# Patient Record
Sex: FEMALE | Race: OTHER | Employment: UNEMPLOYED | URBAN - METROPOLITAN AREA
[De-identification: names, ages, dates, MRNs, and addresses within clinical notes are randomized per-mention and may not be internally consistent; named-entity substitution may affect disease eponyms.]

---

## 2018-10-22 ENCOUNTER — OFFICE VISIT (OUTPATIENT)
Dept: FAMILY MEDICINE CLINIC | Age: 38
End: 2018-10-22

## 2018-10-22 VITALS
WEIGHT: 175 LBS | BODY MASS INDEX: 33.04 KG/M2 | HEIGHT: 61 IN | DIASTOLIC BLOOD PRESSURE: 85 MMHG | SYSTOLIC BLOOD PRESSURE: 134 MMHG | HEART RATE: 60 BPM | TEMPERATURE: 98.4 F

## 2018-10-22 DIAGNOSIS — N94.10 DYSPAREUNIA IN FEMALE: ICD-10-CM

## 2018-10-22 DIAGNOSIS — Z13.9 ENCOUNTER FOR SCREENING: Primary | ICD-10-CM

## 2018-10-22 DIAGNOSIS — N30.01 ACUTE CYSTITIS WITH HEMATURIA: ICD-10-CM

## 2018-10-22 LAB
BILIRUB UR QL STRIP: NORMAL
GLUCOSE UR-MCNC: NEGATIVE MG/DL
HCG URINE, QL. (POC): NEGATIVE
KETONES P FAST UR STRIP-MCNC: NORMAL MG/DL
PH UR STRIP: 5 [PH] (ref 4.6–8)
PROT UR QL STRIP: NORMAL
SP GR UR STRIP: 1.03 (ref 1–1.03)
UA UROBILINOGEN AMB POC: NORMAL (ref 0.2–1)
URINALYSIS CLARITY POC: NORMAL
URINALYSIS COLOR POC: NORMAL
URINE BLOOD POC: NORMAL
URINE LEUKOCYTES POC: NORMAL
URINE NITRITES POC: NEGATIVE
VALID INTERNAL CONTROL?: YES

## 2018-10-22 RX ORDER — NITROFURANTOIN 25; 75 MG/1; MG/1
100 CAPSULE ORAL 2 TIMES DAILY
Qty: 14 CAP | Refills: 0 | Status: SHIPPED | OUTPATIENT
Start: 2018-10-22

## 2018-10-22 NOTE — PROGRESS NOTES
Printed AVS, provided to pt and reviewed. Pt indicated understanding and had no questions. Told pt that rx's have been sent to pharmacy and they should be ready for  in approximately 2 hrs. Pt told to please present GoodRx. com coupon which we provide to your pharmacy to receive discounted price. Reviewed the Macrobid with the pt. The pt was referred to registration to schedule a PAP appt with the provider. Syl Beck was the .   Adria Spicer RN

## 2018-10-22 NOTE — PATIENT INSTRUCTIONS
Infección urinaria en las mujeres: Instrucciones de cuidado - [ Urinary Tract Infection in Women: Care Instructions ]  Instrucciones de cuidado    Margarita infección urinaria (UTI, por shraddha siglas en inglés) es un término general que hace referencia a margarita infección que se produce en cualquier parte entre los riñones y la uretra (conducto por el cual se expulsa la orina). La mayoría de las UTI son infecciones de la vejiga. Con frecuencia, causan dolor o ardor al [place]ian [place]. Las UTI son causadas por bacterias y pueden curarse con antibióticos. Asegúrese de completar el tratamiento para que la infección desaparezca. La atención de seguimiento es margarita parte clave de hartman tratamiento y seguridad. Asegúrese de hacer y acudir a todas las citas, y llame a hartman médico si está teniendo problemas. También es margarita buena idea saber los resultados de shraddha exámenes y mantener margarita lista de los medicamentos que rufino. ¿Cómo puede cuidarse en el hogar? · [address] No deje de tomarlos por el hecho de sentirse mejor. Debe mayito todos los antibióticos hasta terminarlos. · Jalen los próximos jennifer o [address], kaleigh mayor cantidad de [place] y otros líquidos. [place] puede ayudar a eliminar las bacterias que provocan la infección. (Si tiene margarita enfermedad de los riñones, el corazón o el hígado y tiene que [place]'s líquidos, hable con hartman médico antes de aumentar hartman consumo). · Evite las bebidas gaseosas o con cafeína. Pueden irritar la vejiga. · Orine con frecuencia. Trate de vaciar la vejiga cada vez que orine. · Para aliviar el dolor, tome un baño caliente o colóquese margarita almohadilla térmica a baja temperatura sobre la parte baja del abdomen o la harris genital. Nunca se duerma mientras [place] margarita almohadilla térmica. Para prevenir las infecciones urinarias  · Kaleigh abundante agua todos los días. [place] la ayuda a orinar con frecuencia, lo que elimina las bacterias de hartman organismo.  (Si tiene [employer] riñones, el corazón o el hígado y tiene que Kody's líquidos, hable con hartman médico antes de aumentar hartman consumo). · Orine cuando necesite hacerlo. · Orine inmediatamente después de jazmín tenido [employer]. · Cámbiese las toallas sanitarias con frecuencia. · Evite el uso de lavados vaginales, los jose de burbujas, los [place] de higiene femenina y otros productos para la higiene femenina que contengan desodorantes. · Después de ir al baño, límpiese de adelante hacia atrás. ¿Cuándo debes pedir ayuda? Llama a tu médico ahora mismo o busca atención médica inmediata si:    · Aparecen síntomas ciara fiebre, escalofríos, náuseas o vómitos por primera vez, o empeoran.     · Te empieza a doler la espalda, tolu debajo de la caja torácica. A esto se le llama dolor en el flanco.     · Aparece ariana o pus en la orina.     · Tienes problemas con los antibióticos.    Presta especial atención a los cambios en tu nina y asegúrate de comunicarte con tu médico si:    · No mejoras después de jazmín tomado un antibiótico juan carlos 2 días.     · Los síntomas desaparecen y Pearla Primmer. ¿Dónde puede encontrar más información en inglés? Alyssa Colon a http://la-david.info/. Hieu Quivers Q681 en la búsqueda para aprender más acerca de \"Infección urinaria en las mujeres: Instrucciones de cuidado - [ Urinary Tract Infection in Women: Care Instructions ]. \"  Revisado: 21 marzo, 2018  Versión del contenido: 11.8  © 8548-4425 Healthwise, Incorporated. Las instrucciones de cuidado fueron adaptadas bajo licencia por Good Help Connections (which disclaims liability or warranty for this information). Si usted tiene [place] afección médica o sobre estas instrucciones, siempre pregunte a hartman profesional de nina. [employer] niega toda garantía o responsabilidad por hartman uso de esta información.          Relaciones sexuales dolorosas: Instrucciones de cuidado - [ Painful Sex: Care Instructions ]  Instrucciones de cuidado    Las causas de las relaciones sexuales dolorosas pueden ser [place]. Usted puede tener margarita lesión, margarita infección o un tumor en la vagina. O quizás tenga espasmos musculares. En algunos casos, el dolor está causado por otra afección médica, ciara un problema de la columna vertebral.  Algunos medicamentos pueden causar sequedad vaginal. Y a medida que la neelima envejece, la vagina se vuelve más seca. También podría estrecharse, acortarse y [place] rígida. Esta sequedad puede hacer que las relaciones sexuales cinda dolorosas. Hable con hartman médico de lo que podría estar causando el dolor en las [employer]. El tratamiento podría ayudar. La atención de seguimiento es margarita parte clave de hartman tratamiento y seguridad. Asegúrese de hacer y acudir a todas las citas, y llame a hartman médico si está teniendo problemas. También es margarita buena idea saber los resultados de shraddha exámenes y mantener margarita lista de los medicamentos que rufino. ¿Cómo puede cuidarse en el hogar? · Use un lubricante vaginal juan carlos las relaciones sexuales. Los ejemplos incluyen Astroglide, K-Y Jelly y Wet Gel Lubricant. · Aumente la cantidad de tiempo que usted y hartman Earlyne Alonso a tocarse y acariciarse antes de tener relaciones sexuales. [place] se llama juego previo. · Pruebe distintas posiciones sexuales para [employer]. · Pregúntele a hartman médico sobre los ejercicios para fortalecer y relajar los músculos pélvicos. · Antes de [employer], tome un baño caliente. [place] puede relajarla y reducir la ansiedad. · Si hartman médico le receta medicamentos, tómelos exactamente según las indicaciones. Llame a hartman médico si maria de jesus estar teniendo un problema con hartman medicamento. ¿Cuándo debe pedir ayuda? Llame al 911 en cualquier momento que considere que necesita atención de [place].  Por ejemplo, llame si:    · Se desmayó (perdió el conocimiento).     · Tiene un dolor repentino e intenso en el abdomen o la pelvis.    Llame a hartman médico ahora mismo o busque atención médica inmediata si:    · Tiene un dolor nuevo en el abdomen o la pelvis.     · Tiene fiebre y dolor pélvico o flujo vaginal.    Preste especial atención a los cambios en hartman nina y asegúrese de comunicarse con hartman médico si:    · El dolor juan carlos las relaciones sexuales aumenta.     · No mejora ciara se esperaba. ¿Dónde puede encontrar más información en inglés? Diomedes Oconnor a http://la-david.info/. Haris Benitez N364 en la búsqueda para aprender más acerca de \"Relaciones sexuales dolorosas: Instrucciones de cuidado - [ Painful Sex: Care Instructions ]. \"  Revisado: 15 mayo, 2018  Versión del contenido: 11.8  © 4085-9111 Healthwise, Incorporated. Las instrucciones de cuidado fueron adaptadas bajo licencia por Good Help Connections (which disclaims liability or warranty for this information). Si usted tiene [place] afección médica o sobre estas instrucciones, siempre pregunte a hartman profesional de nina. [employer] niega toda garantía o responsabilidad por hartman uso de esta información.

## 2018-10-22 NOTE — PROGRESS NOTES
Coordination of Care  1. Have you been to the ER, urgent care clinic since your last visit? Hospitalized since your last visit? No    2. Have you seen or consulted any other health care providers outside of the [address] [place] [address] since your last visit? Include any pap smears or colon screening. No    Does the patient need refills? NO    Learning Assessment Complete?  yes  Depression Screening complete in the past 12 months? yes     Results for orders placed or performed in visit on 10/22/18   AMB POC URINALYSIS DIP STICK MANUAL W/O MICRO   Result Value Ref Range    Color (UA POC) Dark Yellow     Clarity (UA POC) Cloudy     Glucose (UA POC) Negative Negative    Bilirubin (UA POC) 1+ Negative    Ketones (UA POC) 1+ Negative    Specific gravity (UA POC) 1.030 1.001 - 1.035    Blood (UA POC) 3+ Negative    pH (UA POC) 5.0 4.6 - 8.0    Protein (UA POC) Trace Negative    Urobilinogen (UA POC) 0.2 mg/dL 0.2 - 1    Nitrites (UA POC) Negative Negative    Leukocyte esterase (UA POC) 3+ Negative   AMB POC URINE PREGNANCY TEST, VISUAL COLOR COMPARISON   Result Value Ref Range    VALID INTERNAL CONTROL POC Yes     HCG urine, Ql. (POC) Negative Negative

## 2018-10-24 NOTE — PROGRESS NOTES
Assessment/Plan:    Diagnoses and all orders for this visit:    1. Encounter for screening  -     AMB POC URINALYSIS DIP STICK MANUAL W/O MICRO  -     AMB POC URINE PREGNANCY TEST, VISUAL COLOR COMPARISON    2. Dyspareunia in female    3. Acute cystitis with hematuria  -     nitrofurantoin, macrocrystal-monohydrate, (MACROBID) 100 mg capsule; Take 1 Cap by mouth two (2) times a day. Follow-up Disposition:  Return in about 2 months (around 12/22/2018) for gyn clinic with me. MARIVEL Faye expressed understanding of this plan. An AVS was printed and given to the patient.      ----------------------------------------------------------------------    Chief Complaint   Patient presents with    Urinary Pain     & painful when having sexual relations w/low back pain. x1 year. +discharge. History of Present Illness:  42 yo, not on any form of birth control for 8 years and no pregnancy. Has years of painful intercourse. The pain seems to be worse after intercourse. She is in monogamous, safe relationship, no risk of DV. Her last pap test was 4 years ago and we will schedule her for gyn exam with me at next available clinic  No abnl vaginal discharge. Normal periods  Has painful urination for the past few days. + frequency and + incomplete emptying sensation   She states that she is not stressed about having intercourse and that she does want to have sex     Past Medical History:   Diagnosis Date    Asthma     takes advair bid    Diabetes mellitus     gestational    Pap smear for cervical cancer screening may 2013       Current Outpatient Medications   Medication Sig Dispense Refill    nitrofurantoin, macrocrystal-monohydrate, (MACROBID) 100 mg capsule Take 1 Cap by mouth two (2) times a day.  14 Cap 0       No Known Allergies    Social History     Tobacco Use    Smoking status: Never Smoker    Smokeless tobacco: Never Used   Substance Use Topics    Alcohol use: No    Drug use: No       Family History   Problem Relation Age of Onset    Hypertension Father     Asthma Maternal Grandmother     Diabetes Paternal Grandmother        Physical Exam:     Visit Vitals  /85 (BP 1 Location: Right arm)   Pulse 60   Temp 98.4 °F (36.9 °C) (Oral)   Ht 5' 0.83\" (1.545 m)   Wt 175 lb (79.4 kg)   LMP 09/28/2018   BMI 33.25 kg/m²       A&Ox3  WDWN NAD  Respirations normal and non labored  Results for orders placed or performed in visit on 10/22/18   AMB POC URINALYSIS DIP STICK MANUAL W/O MICRO     Status: None   Result Value Ref Range Status    Color (UA POC) Dark Yellow  Final    Clarity (UA POC) Cloudy  Final    Glucose (UA POC) Negative Negative Final    Bilirubin (UA POC) 1+ Negative Final    Ketones (UA POC) 1+ Negative Final    Specific gravity (UA POC) 1.030 1.001 - 1.035 Final    Blood (UA POC) 3+ Negative Final    pH (UA POC) 5.0 4.6 - 8.0 Final    Protein (UA POC) Trace Negative Final    Urobilinogen (UA POC) 0.2 mg/dL 0.2 - 1 Final    Nitrites (UA POC) Negative Negative Final    Leukocyte esterase (UA POC) 3+ Negative Final

## 2018-10-25 ENCOUNTER — CLINICAL SUPPORT (OUTPATIENT)
Dept: FAMILY MEDICINE CLINIC | Age: 38
End: 2018-10-25

## 2018-10-25 DIAGNOSIS — Z23 ENCOUNTER FOR IMMUNIZATION: Primary | ICD-10-CM

## 2018-10-25 NOTE — PROGRESS NOTES
Nilam Arvizu Brain in Hand flu vaccine. Denies fever and egg allergy. VIS information sheet given to patient. Explained possible s/e. Reviewed s/sx indicating need to be seen in ER. Patient had no adverse reaction at time of discharge. Entered into VIIS. GIVEN BY MAISHA MICHAEL, [facility].  Rory Garcia RN

## 2018-10-26 ENCOUNTER — TELEPHONE (OUTPATIENT)
Dept: FAMILY MEDICINE CLINIC | Age: 38
End: 2018-10-26

## 2018-11-09 ENCOUNTER — TELEPHONE (OUTPATIENT)
Dept: FAMILY MEDICINE CLINIC | Age: 38
End: 2018-11-09

## 2018-11-09 NOTE — TELEPHONE ENCOUNTER
Refill requested received from Walt Caraballo for Nitrofurantoin 100 mg disp 14. Chart review shows provider spoke to pt 10/26/18 who never picked up medication and was told to return to clinic for problems.  Refill denied and faxed back to pharmacy

## 2019-03-04 ENCOUNTER — HOSPITAL ENCOUNTER (OUTPATIENT)
Dept: LAB | Age: 39
Discharge: HOME | End: 2019-03-04

## 2019-03-04 ENCOUNTER — OFFICE VISIT (OUTPATIENT)
Dept: FAMILY MEDICINE CLINIC | Age: 39
End: 2019-03-04

## 2019-03-04 VITALS
HEART RATE: 70 BPM | DIASTOLIC BLOOD PRESSURE: 78 MMHG | TEMPERATURE: 98.2 F | HEIGHT: 61 IN | WEIGHT: 180 LBS | SYSTOLIC BLOOD PRESSURE: 133 MMHG | BODY MASS INDEX: 33.99 KG/M2 | OXYGEN SATURATION: 98 %

## 2019-03-04 DIAGNOSIS — Z01.419 WELL WOMAN EXAM WITH ROUTINE GYNECOLOGICAL EXAM: Primary | ICD-10-CM

## 2019-03-04 DIAGNOSIS — N94.10 DYSPAREUNIA IN FEMALE: ICD-10-CM

## 2019-03-04 DIAGNOSIS — Z01.419 WELL WOMAN EXAM WITH ROUTINE GYNECOLOGICAL EXAM: ICD-10-CM

## 2019-03-04 DIAGNOSIS — N89.8 VAGINAL DISCHARGE: ICD-10-CM

## 2019-03-04 PROCEDURE — 87624 HPV HI-RISK TYP POOLED RSLT: CPT

## 2019-03-04 PROCEDURE — 88175 CYTOPATH C/V AUTO FLUID REDO: CPT

## 2019-03-04 PROCEDURE — 87591 N.GONORRHOEAE DNA AMP PROB: CPT

## 2019-03-04 NOTE — PATIENT INSTRUCTIONS
Visita de control para personas de 18 a 50 años: Instrucciones de cuidado - [ Well Visit, Ages 25 to 48: Care Instructions ]  Instrucciones de cuidado    Los exámenes físicos pueden ayudarle a mantenerse saludable. Wilhelm médico stewart revisado wilhelm estado general de nina y podría haberle dado algunos consejos para cuidarse. También podría haberle recomendado otros exámenes. En wilhelm hogar, usted puede ayudar a prevenir enfermedades si come de manera saludable, hace ejercicio con regularidad y sigue otras recomendaciones. La atención de seguimiento es margarita parte clave de wilhelm tratamiento y seguridad. Asegúrese de hacer y acudir a todas las citas, y llame a wilhelm médico si está teniendo problemas. También es margarita buena idea saber los resultados de shraddha exámenes y mantener margarita lista de los medicamentos que rufino. ¿Cómo puede cuidarse en el hogar? · Alcance un peso saludable y [place]. [place] disminuirá el riesgo de tener [employer], tales ciara obesidad, diabetes, enfermedad cardíaca y presión arterial noah. · Di actividad física por lo menos 30 minutos la mayoría de los días de la [place]. Caminar es margarita buena opción. Haylee Schmitz desee hacer otras actividades, ciara correzee perez, [employer], o jugar al tenis u otros deportes de equipo. Discuta con wilhelm médico cualquier cambio que quiera introducir en wilhelm programa de ejercicios. · No fume ni permita que otros fumen cerca de usted. Si necesita ayuda para dejar de fumar, hable con wilhelm médico sobre programas y medicamentos para dejar de fumar. Pueden aumentar shraddha probabilidades de dejar el hábito para siempre. · Consulte con wilhelm médico si presenta factores de riesgo de infecciones de transmisión sexual (STI, por shraddha siglas en inglés). Tener margarita sima kris sexual (que no tenga STI y que no tenga relaciones sexuales con nadie más) es margarita buena manera de evitar estas infecciones. · Utilice métodos anticonceptivos si no desea tener hijos en jered momento.  Consulte con wilhelm médico acerca de las opciones disponibles más adecuadas para usted. · Protéjase la piel del exceso de sol. [address] 10 a.m. y las 4 p.m., permanezca a la yumi o Samantha Borne con prendas de vestir y un sombrero de ala ancha. Use gafas de sol que bloqueen los connie ultravioleta. Póngase un protector solar de amplio espectro (SPF 30 o superior) en la piel expuesta, incluso cuando esté nublado. · Acuda al dentista [facility] veces al año para hacerse chequeos y limpiezas dentales. · En el automóvil, use el cinturón de seguridad. · [place] alcohol en forma moderada, o no kaleigh nada. [place] significa no más de 2 bebidas al día si es hombre, y no más de 1 al día si es [place]. Siga las recomendaciones de hartman médico acerca de cuándo hacerse determinados exámenes. Estas pruebas pueden detectar problemas a tiempo. Para ambos sexos  · Colesterol. Hágase un análisis de la grasa (colesterol) en la ariana después de los 21 años de E[place]. Hartman médico le indicará con qué frecuencia debe [employer] análisis según hartman edad, shraddha antecedentes familiares u otros factores que pueden aumentar el riesgo de enfermedad cardíaca. · Presión arterial. Hágase mayito la presión arterial juan carlos margarita visita de rutina al médico. [place] médico le dirá con qué frecuencia debe revisarse la presión arterial según hartman edad, shraddha niveles de presión arterial y otros factores. · Visión. Hable con hartman médico acerca de la frecuencia con que debe hacerse margarita prueba de glaucoma. · Diabetes. Pregúntele a hartman médico si debería hacerse pruebas para la diabetes. · Cáncer de colon. Hágase margarita prueba para el cáncer de colon a los 48 años. Usted podría hacerse margarita de las [address]. Si tiene menos de [address], podría necesitar hacerse margarita prueba antes si tiene factores de [place]. Los factores de riesgo incluyen si ya le dumont extraído un pólipo precanceroso del colon o si alguno de shraddha padres, hermanos o hijos stewart tenido cáncer de colon.   [address] mujeres  · El examen de senos y la [place]. Pregúntele a wilhelm médico cuándo debe hacerse un examen clínico de los senos (mamas) y [place]. Los expertos médicos no están de acuerdo en si margarita neelima de menos de 48 años de edad debe o no hacerse estos exámenes o con qué frecuencia. Wilhelm médico puede ayudarle a decidir qué es lo adecuado para usted. · La prueba de Papanicolaou y el examen Dewayne Bautista a hacerse pruebas de Papanicolaou a los 21 años. El Papanicolaou es la mejor manera de detectar el cáncer de van uterino. Esta prueba suele ser parte del examen pélvico. Pregunte con qué frecuencia debe hacerse esta prueba. · Infecciones de transmisión sexual (STI, por shraddha siglas en inglés). Consulte si debe hacerse pruebas de detección de STI. Puede correr riesgo si tiene [employer] con más de Dorann Saurabh persona, especialmente si shraddha parejas no utilizan condones. Para los hombres  · Infecciones de transmisión sexual (STI). Consulte si debe hacerse pruebas de detección de STI. Puede correr riesgo si tiene [employer] con más de Dorann Saurabh persona, especialmente si usted no utiliza condón. · Examen para el cáncer testicular. Pregúntele a wilhelm médico si debería hacerse un examen de testículos con regularidad. · Examen de próstata. Hable con wilhelm médico para saber si debe hacerse un análisis de ariana para el cáncer de próstata (prueba del PSA, por shraddha siglas en inglés). Los expertos difieren en cuanto a si los hombres deben hacerse esta prueba y con qué frecuencia. Algunos especialistas lo recomiendan a las personas mayores de 39 años de origen afroamericano o que tienen un padre o un tim que tuvo cáncer de próstata antes de los 65 [place]. ¿Cuándo debe pedir ayuda? Preste especial atención a los cambios en wilhelm nina y asegúrese de comunicarse con wilhelm médico si tiene problemas o síntomas que le preocupan. ¿Dónde puede encontrar más información en inglés?   Isaac Late a http://la-david.info/. Escriba P072 en la búsqueda para aprender más acerca de \"Visita de control para personas de 18 a 50 años: Instrucciones de cuidado - [ Well Visit, Ages 25 to 48: Care Instructions ]. \"  Revisado: [address], 2018  Versión del contenido: 11.9  © 6914-8681 Healthwise, Incorporated. Las instrucciones de cuidado fueron adaptadas bajo licencia por Good [employer] Conne[employer] (which disclaims liability or warranty for this information). Si usted tiene [place] afección médica o sobre estas instrucciones, siempre pregunte a hartman profesional de nina. Healthwise, Incorporated niega toda garantía o responsabilidad por hartman uso de esta información. Prueba de Papanicolaou: Instrucciones de cuidado - [ Pap Test: Care Instructions ]  Instrucciones de cuidado    La prueba de Papanicolaou (también llamada \"Pap smear\" o citología vaginal) es margarita prueba para detectar el cáncer de van uterino, la parte inferior del útero que se abre hacia la vagina. Esta prueba puede ayudar a hartman médico a encontrar cambios tempranos en las células que pudieran convertirse en cáncer. La muestra de células tomada juan carlos hartman prueba ha sido enviada a un laboratorio para que un experto las examine. Por lo general, rufino margarita o [place] para recibir [place]. La atención de seguimiento es margarita parte clave de hartman tratamiento y seguridad. Asegúrese de hacer y acudir a todas las citas, y llame a hartman médico si está teniendo problemas. También es margarita buena idea saber los resultados de shraddha exámenes y mantener margarita lista de los medicamentos que rufino. ¿Qué significan los resultados? · Un resultado normal indica que la prueba no halló células anormales en la muestra. · Un resultado anormal puede significar muchas cosas. Elfredia Sames no son cáncer. Los resultados de la prueba podrían ser anormales porque:  ?  Usted tiene [employer] vagina o el van uterino, ciara margarita infección por hongos en forma de levadura. ? Tiene un DIU (dispositivo intrauterino anticonceptivo). ? Usted tiene bajos niveles de estrógeno después de la menopausia que provocan cambios en las células. ? Tiene cambios celulares que podrían ser señal de precáncer o cáncer. Los [employer] se clasifican según la gravedad de los [place]. Hay muchas otras razones por las Rodrigo Wilder podrían no ser normales. Si los [employer], podría necesitar otra prueba en cuestión de algunas semanas o meses. Si los resultados muestran cambios que pudieran ser señal de cáncer, podría necesitar margarita prueba llamada colposcopia, que ofrece margarita vista más completa del van uterino. A veces el laboratorio no puede usar la [place] porque no contiene suficientes células o no está bobbi conservada. Si es así, podría ser necesario que se roger la prueba de [place]. [place] no es común, debbie pasa algunas veces. ¿Cuándo debe pedir ayuda? Preste especial atención a los cambios en hartman nina y asegúrese de comunicarse con hartman médico si:    · Tiene sangrado vaginal o dolor juan carlos más de 2 días después de la prueba. Es normal tener algo de sangrado juan carlos jennifer o [address] ben después de la prueba. ¿Dónde puede encontrar más información en inglés? Doc Talamantes a http://la-david.info/. Dipesh Alvarado X358 en la búsqueda para aprender más acerca de \"Prueba de Papanicolaou: Instrucciones de cuidado - [ Pap Test: Care Instructions ]. \"  Revisado: Livia [address], 2018  Versión del contenido: 11.9  © 5633-9257 [employer] Inc[employer]. Las instrucciones de cuidado fueron adaptadas bajo licencia por Good Help Connections (which disclaims liability or warranty for this information). Si usted tiene [place] afección médica o sobre estas instrucciones, siempre pregunte a hartman profesional de nina. [facility], Incorporated niega toda garantía o responsabilidad por hartman uso de esta información.

## 2019-03-04 NOTE — PROGRESS NOTES
Humera Carlson is a 41 y.o. female    Issues discussed today include:    Chief Complaint   Patient presents with    Well Woman     PAP smear, last PAP was 2 years ago w/ normal results as per patient. Subjective:   Patrick Servin is a 42 y.o. female. Presenting for her annual well woman exam with pap smear. Pt has complaint for this visit: Painful intercourse for many years, located in left adnexal area. Has been to other clinics without answers for this. No pain outside of intercourse. Lately has had increased yellow discharge. She has two pelvic ultrasounds, they didn't find anything. Smoking Hx: no  Alcohol Use: no  Supplemental/OTC Meds:  no  Folic acid [id number] (childbearing age):  no  Patient's last menstrual period was 02/03/2019. Comes monthly. Pt is sexually active with one male partner,  of 21 years. Not currently using birth control, has gotten side effects with any method she tried. They use condoms. [id number], all born vaginally. Preventive Screenings:  Last pelvic/PAP: 2 yrs ago, was normal per pt. No h/o abnl pap smears. Last mammogram: had h/o \"little balls\" in her breast - was investigated with mammogram ~ 5 yrs ago, was benign. Denies acute breast concerns, skin changes or nipple drainage. Immunizations:    - Last TDAP (every 10 years): > 10 yrs ago  - Last Flu shot: 10/2018  Agrees to flu shot today? N/A      Data reviewed or ordered today:       Other problems include: There is no problem list on file for this patient. Medications:  Current Outpatient Medications   Medication Sig Dispense Refill    nitrofurantoin, macrocrystal-monohydrate, (MACROBID) 100 mg capsule Take 1 Cap by mouth two (2) times a day. 14 Cap 0       Allergies:  No Known Allergies    LMP:  Patient's last menstrual period was 02/03/2019.     Social History     Socioeconomic History    Marital status:      Spouse name: Not on file    Number of children: Not on file  Years of education: Not on file    Highest education level: Not on file   Social Needs    Financial resource strain: Not on file    Food insecurity - worry: Not on file    Food insecurity - inability: Not on file    Transportation needs - medical: Not on file   [employer] needs - non-medical: Not on file   Occupational History    Not on file   Tobacco Use    Smoking status: Never Smoker    Smokeless tobacco: Never Used   Substance and Sexual Activity    Alcohol use: No    Drug use: No    Sexual activity: Yes     Partners: Male     Birth control/protection: None   Other Topics Concern    Not on file   Social History Narrative    ** Merged History Encounter **            Family History   Problem Relation Age of Onset    Hypertension Father     Asthma Maternal Grandmother     Diabetes Paternal Grandmother          Physical Exam   Visit Vitals  /78 (BP 1 Location: Left arm, BP Patient Position: Sitting)   Pulse 70   Temp 98.2 °F (36.8 °C) (Oral)   Ht 5' 0.83\" (1.545 m)   Wt 180 lb (81.6 kg)   LMP 02/03/2019   SpO2 98%   Breastfeeding? No   BMI 34.20 kg/m²      BP Readings from Last 3 Encounters:   03/04/19 133/78   10/22/18 134/85   07/10/13 132/83     Constitutional: Appears well,  No acute distress, Vitals noted  Psychiatric:  Affect normal, Alert and Oriented to person/place/time  Eyes:  Conjunctiva clear, no drainage  ENT:  External ears and nose normal, Mucous membranes moist  Neck:  General inspection normal. Supple. Lungs:  No respiratory distress  : Normal appearing vulva and vagina, mod yellow vaginal discharge, no cervical changes or discharge, wide ectropion, no CMT, uterus and adnexa normal to palpation and nontender  Abd: no lower abdominal tenderness on today's exam  Extremities: Without edema, good peripheral pulses  Skin:  Warm to palpation, without rashes      Assessment/Plan:      ICD-10-CM ICD-9-CM    1.  Well woman exam with routine gynecological exam Z01.419 V72.31 PAP IG, APTIMA HPV AND RFX 16/18,45 ([id number])      CHLAMYDIA/GC PCR   2. Vaginal discharge N89.8 623.5 CHLAMYDIA/GC PCR   3. Dyspareunia in female N94.10 625.0 CHLAMYDIA/GC PCR     No abnl finding on  exam today, no CMT or tenderness on bimanual exam. Has had pelvic us in past without abnl findings. Possible dyspareunia 2/2 endometriosis.   AVS with wellness handouts  Will send pap results via letter if normal or call pt with pap smear results if f/u needed  Pt to be given Tdap vaccine today  Already had flu vaccine this season      Follow-up Disposition:  Return in about 1 year (around 3/4/2020) for Well woman exam.        Torin Smith MD  [address] [place] [address]

## 2019-03-04 NOTE — PROGRESS NOTES
INT # [id number]. Printed AVS, provided to pt and reviewed. Pt indicated understanding and had no questions. Pt told she would get a letter in the  mail if the PAP results are normal and she would get a call if she needed further f/u. The pt was given a Tdap in 2016 per [facility] records so a Tdap is not needed today. This was explained to the pt. The pt verbalized understanding and denied further questions.  Jose G Smalls RN

## 2019-03-08 LAB
C TRACH DNA SPEC QL NAA+PROBE: NEGATIVE
N GONORRHOEA DNA SPEC QL NAA+PROBE: NEGATIVE
SAMPLE TYPE: NORMAL
SERVICE CMNT-IMP: NORMAL
SPECIMEN SOURCE: NORMAL

## 2023-12-10 ENCOUNTER — APPOINTMENT (OUTPATIENT)
Facility: HOSPITAL | Age: 43
End: 2023-12-10

## 2023-12-10 ENCOUNTER — HOSPITAL ENCOUNTER (EMERGENCY)
Facility: HOSPITAL | Age: 43
Discharge: HOME | End: 2023-12-10
Attending: STUDENT IN AN ORGANIZED HEALTH CARE EDUCATION/TRAINING PROGRAM

## 2023-12-10 VITALS
BODY MASS INDEX: 35.14 KG/M2 | SYSTOLIC BLOOD PRESSURE: 137 MMHG | OXYGEN SATURATION: 98 % | HEIGHT: 60 IN | DIASTOLIC BLOOD PRESSURE: 87 MMHG | WEIGHT: 179 LBS | HEART RATE: 68 BPM | RESPIRATION RATE: 19 BRPM | TEMPERATURE: 97.8 F

## 2023-12-10 DIAGNOSIS — T78.40XA ALLERGIC REACTION, INITIAL ENCOUNTER: Primary | ICD-10-CM

## 2023-12-10 LAB
COMMENT:: NORMAL
SPECIMEN HOLD: NORMAL

## 2023-12-10 PROCEDURE — A4216 STERILE WATER/SALINE, 10 ML: HCPCS | Performed by: STUDENT IN AN ORGANIZED HEALTH CARE EDUCATION/TRAINING PROGRAM

## 2023-12-10 PROCEDURE — 96374 THER/PROPH/DIAG INJ IV PUSH: CPT

## 2023-12-10 PROCEDURE — 71260 CT THORAX DX C+: CPT

## 2023-12-10 PROCEDURE — 2580000003 HC RX 258: Performed by: STUDENT IN AN ORGANIZED HEALTH CARE EDUCATION/TRAINING PROGRAM

## 2023-12-10 PROCEDURE — 6360000004 HC RX CONTRAST MEDICATION

## 2023-12-10 PROCEDURE — 36415 COLL VENOUS BLD VENIPUNCTURE: CPT

## 2023-12-10 PROCEDURE — 2500000003 HC RX 250 WO HCPCS: Performed by: STUDENT IN AN ORGANIZED HEALTH CARE EDUCATION/TRAINING PROGRAM

## 2023-12-10 PROCEDURE — 6370000000 HC RX 637 (ALT 250 FOR IP): Performed by: STUDENT IN AN ORGANIZED HEALTH CARE EDUCATION/TRAINING PROGRAM

## 2023-12-10 PROCEDURE — 99285 EMERGENCY DEPT VISIT HI MDM: CPT

## 2023-12-10 RX ORDER — FAMOTIDINE 20 MG/1
20 TABLET, FILM COATED ORAL 2 TIMES DAILY
Qty: 10 TABLET | Refills: 0 | Status: SHIPPED | OUTPATIENT
Start: 2023-12-10 | End: 2023-12-15

## 2023-12-10 RX ORDER — PREDNISONE 50 MG/1
50 TABLET ORAL DAILY
Qty: 4 TABLET | Refills: 0 | Status: SHIPPED | OUTPATIENT
Start: 2023-12-11 | End: 2023-12-15

## 2023-12-10 RX ORDER — DIPHENHYDRAMINE HCL 25 MG
25 CAPSULE ORAL ONCE
Status: COMPLETED | OUTPATIENT
Start: 2023-12-10 | End: 2023-12-10

## 2023-12-10 RX ORDER — PREDNISONE 20 MG/1
60 TABLET ORAL ONCE
Status: COMPLETED | OUTPATIENT
Start: 2023-12-10 | End: 2023-12-10

## 2023-12-10 RX ORDER — DIPHENHYDRAMINE HCL 25 MG
25 CAPSULE ORAL EVERY 6 HOURS PRN
Qty: 15 CAPSULE | Refills: 0 | Status: SHIPPED | OUTPATIENT
Start: 2023-12-10 | End: 2023-12-14

## 2023-12-10 RX ADMIN — FAMOTIDINE 20 MG: 10 INJECTION, SOLUTION INTRAVENOUS at 14:07

## 2023-12-10 RX ADMIN — PREDNISONE 60 MG: 20 TABLET ORAL at 14:01

## 2023-12-10 RX ADMIN — DIPHENHYDRAMINE HYDROCHLORIDE 25 MG: 25 CAPSULE ORAL at 14:01

## 2023-12-10 RX ADMIN — IOPAMIDOL 100 ML: 755 INJECTION, SOLUTION INTRAVENOUS at 13:59

## 2023-12-10 ASSESSMENT — PAIN SCALES - GENERAL
PAINLEVEL_OUTOF10: 5
PAINLEVEL_OUTOF10: 0

## 2023-12-10 ASSESSMENT — PAIN - FUNCTIONAL ASSESSMENT
PAIN_FUNCTIONAL_ASSESSMENT: NONE - DENIES PAIN
PAIN_FUNCTIONAL_ASSESSMENT: NONE - DENIES PAIN
PAIN_FUNCTIONAL_ASSESSMENT: ACTIVITIES ARE NOT PREVENTED

## 2023-12-10 ASSESSMENT — PAIN DESCRIPTION - DESCRIPTORS: DESCRIPTORS: ACHING

## 2023-12-10 ASSESSMENT — PAIN DESCRIPTION - ORIENTATION: ORIENTATION: OTHER (COMMENT)

## 2023-12-10 ASSESSMENT — PAIN DESCRIPTION - PAIN TYPE: TYPE: ACUTE PAIN

## 2023-12-10 ASSESSMENT — PAIN DESCRIPTION - LOCATION: LOCATION: HEAD

## 2023-12-10 NOTE — ED NOTES
1:07 PM    Patient is an 41 y.o. with no known past medical history who presents to the ER with reports of upper body swelling over the past 3 days. Patient reports she has been taking something OTC for itching, but nothing is making it better. Patient denies any new lotions, detergents, medications, or new foods. Patient is able to ambulate, speak, and airway intact. Patient reports her throat is feeling scratchy. Patient denies any vomiting, but admits to a headache. I have evaluated the patient as the Provider in Rapid Medical Evaluation (RME). I have reviewed her vital signs and the triage nurse assessment. I have talked with the patient and any available family and advised that I am the provider in triage and have ordered the appropriate study to initiate their work up based on the clinical presentation during my assessment. I have advised that the patient will be accommodated in the Main ED as soon as possible. I have also requested to contact the triage nurse or myself immediately if the patient experiences any changes in their condition during this brief waiting period.     [address] [facility], BRYCE Olguin PA-C  12/10/23 [id number]

## 2023-12-10 NOTE — ED TRIAGE NOTES
Pt has had facial swelling, upper body swelling and redness/itching x 3 days. No known allergies. Denies any new detergents/soaps.  Has been taking OTC meds to help with sx. +throat itching

## 2023-12-10 NOTE — DISCHARGE INSTRUCTIONS
You are seen today for evaluation of facial swelling and allergic reaction. Your symptoms are consistent with allergic reaction, unclear what is triggering your allergies. You have been given a referral to allergy for follow-up and testing. Please take steroids, Benadryl, and Pepcid as instructed.   Please return to the emergency department immediately if you have oral involvement, facial swelling which is worsening or severe, difficulty breathing, difficulty swallowing or any other new or concerning symptoms    Thank you for letting us take care of you, hope you feel better soon,  Anna Fuchs MD

## 2023-12-10 NOTE — ED PROVIDER NOTES
[facility] EMERGENCY DEP  EMERGENCY DEPARTMENT ENCOUNTER      Pt Name: Spring Freitas  MRN: [id number]  [address] [facility] 1980  Date of evaluation: 12/10/2023  Provider: Zander Mcgraw MD    CHIEF COMPLAINT       Chief Complaint   Patient presents with    Facial Swelling    Allergic Reaction         HISTORY OF PRESENT ILLNESS   (Location/Symptom, Timing/Onset, Context/Setting, Quality, Duration, Modifying Factors, Severity)  Note limiting factors. HPI    45-year-old female with no reported past medical history presenting for evaluation of facial swelling. Patient reports that she has had upper body and facial swelling for 3 days. Reports no known allergies. Denies any new soaps. Reports no other skin findings or rash. Reports that she has had no nausea vomiting or diarrhea. Denies any wheezing or difficulty breathing. Reports that she has no history of high blood pressure and does not take lisinopril. Reports has never had similar reaction in the past.  Reports no bites or stings. Denies shortness of breath or wheezing    Review of External Medical Records:         Nursing Notes were reviewed. REVIEW OF SYSTEMS    (2-9 systems for level 4, 10 or more for level 5)     Except as noted above the remainder of the review of systems was reviewed and negative. PAST MEDICAL HISTORY   No past medical history on file. SURGICAL HISTORY     No past surgical history on file. CURRENT MEDICATIONS       Previous Medications    No medications on file       ALLERGIES     Patient has no known allergies. FAMILY HISTORY     No family history on file.        SOCIAL HISTORY       Social History     Socioeconomic History    Marital status:            PHYSICAL EXAM    (up to 7 for level 4, 8 or more for level 5)     ED Triage Vitals   BP Temp Temp Source Pulse Respirations SpO2 Height Weight - Scale   12/10/23 1245 12/10/23 1245 12/10/23 1245 12/10/23 1245 12/10/23 1245 12/10/23 1245 12/10/23

## 2024-05-14 ENCOUNTER — HOSPITAL ENCOUNTER (OUTPATIENT)
Facility: HOSPITAL | Age: 44
Setting detail: SPECIMEN
Discharge: HOME | End: 2024-05-17

## 2024-05-14 PROCEDURE — 80061 LIPID PANEL: CPT

## 2024-05-14 PROCEDURE — 80048 BASIC METABOLIC PNL TOTAL CA: CPT

## 2024-05-14 PROCEDURE — 83036 HEMOGLOBIN GLYCOSYLATED A1C: CPT

## 2024-05-14 PROCEDURE — 36415 COLL VENOUS BLD VENIPUNCTURE: CPT

## 2024-05-15 LAB
ANION GAP SERPL CALC-SCNC: 2 MMOL/L (ref 5–15)
BUN SERPL-MCNC: 10 MG/DL (ref 6–20)
BUN/CREAT SERPL: 17 (ref 12–20)
CALCIUM SERPL-MCNC: 8.7 MG/DL (ref 8.5–10.1)
CHLORIDE SERPL-SCNC: 108 MMOL/L (ref 97–108)
CHOLEST SERPL-MCNC: 184 MG/DL
CO2 SERPL-SCNC: 29 MMOL/L (ref 21–32)
CREAT SERPL-MCNC: 0.58 MG/DL (ref 0.55–1.02)
EST. AVERAGE GLUCOSE BLD GHB EST-MCNC: 120 MG/DL
GLUCOSE SERPL-MCNC: 93 MG/DL (ref 65–100)
HBA1C MFR BLD: 5.8 % (ref 4–5.6)
HDLC SERPL-MCNC: 57 MG/DL
HDLC SERPL: 3.2 (ref 0–5)
LDLC SERPL CALC-MCNC: 98.4 MG/DL (ref 0–100)
POTASSIUM SERPL-SCNC: 4.2 MMOL/L (ref 3.5–5.1)
SODIUM SERPL-SCNC: 139 MMOL/L (ref 136–145)
TRIGL SERPL-MCNC: 143 MG/DL
VLDLC SERPL CALC-MCNC: 28.6 MG/DL

## 2025-01-14 ENCOUNTER — TELEPHONE (OUTPATIENT)
Age: 45
End: 2025-01-14

## 2025-01-14 NOTE — TELEPHONE ENCOUNTER
Call from this patient's #, but unable to reach her again after getting  and with several attempts.  Phone goes immediately to mail box full.

## 2025-03-05 ENCOUNTER — TRANSCRIBE ORDERS (OUTPATIENT)
Facility: HOSPITAL | Age: 45
End: 2025-03-05

## 2025-03-05 DIAGNOSIS — Z12.31 VISIT FOR SCREENING MAMMOGRAM: Primary | ICD-10-CM

## 2025-03-08 ENCOUNTER — OFFICE VISIT (OUTPATIENT)
Age: 45
End: 2025-03-08

## 2025-03-08 ENCOUNTER — HOSPITAL ENCOUNTER (OUTPATIENT)
Facility: HOSPITAL | Age: 45
Setting detail: SPECIMEN
Discharge: HOME | End: 2025-03-11

## 2025-03-08 ENCOUNTER — HOSPITAL ENCOUNTER (OUTPATIENT)
Facility: HOSPITAL | Age: 45
Discharge: HOME | End: 2025-03-11

## 2025-03-08 VITALS — HEIGHT: 60 IN | WEIGHT: 178 LBS | BODY MASS INDEX: 34.95 KG/M2

## 2025-03-08 DIAGNOSIS — Z12.31 VISIT FOR SCREENING MAMMOGRAM: ICD-10-CM

## 2025-03-08 DIAGNOSIS — Z01.419 WELL WOMAN EXAM: Primary | ICD-10-CM

## 2025-03-08 PROCEDURE — 87624 HPV HI-RISK TYP POOLED RSLT: CPT

## 2025-03-08 PROCEDURE — 77063 BREAST TOMOSYNTHESIS BI: CPT

## 2025-03-08 PROCEDURE — 88175 CYTOPATH C/V AUTO FLUID REDO: CPT

## 2025-03-08 NOTE — PROGRESS NOTES
EVERY WOMANS LIFE HISTORY QUESTIONNAIRE     used  [] No    [x]   Yes    Ht--5'    Wt--178    Do you have any breast concerns today?  __no____    Are you experiencing any of the following?   No Yes Comments   Nipple Discharge [x]                                  []                                     Breast Lump/Masses [x]                                  []                                     Breast Skin Changes [x]                                  []                                           When and where was last mammogram performed?  __about 20 years ago with [facility] Pr was told she had cyst that they were benign but to decrease her caffeine._     No Yes Comments   Have you ever had a mammogram that required additional follow up?  [x]     []        Have you ever had a breast biopsy? [x]                                  []                                     Do you have breast implants?  [x]        []                When and where was your last Pap test performed? ___2019 _NIL/HPV- [facility]. Pt will have pap today    Have you ever had an abnormal Pap test? ( Please note, if Hx of Colposcopy, LEEP, CKC, REYNA 2 or 3)  No Yes Comments   [x]                                  []                                       Have you been through menopause?   No Yes Date of LMP   [x]                                  []                                       For patients who are still menstruating: Do you use any form of family planning?none    Have you had a hysterectomy?   No Yes Comments (why)   [x]                                  []                                        No Yes Comments   Vaginal Discharge [x]                                  []                                     Abnormal/unusual vaginal bleeding    (Post menopausal  [x]                                  []                                       Did your mother take ASHLEY?  No Yes Unknown   []                                  []

## 2025-03-08 NOTE — PROGRESS NOTES
Gabrielle Aguayo   1980 44 y.o. Female    Patient seen today for a Screening Mammogram and Pap smear.Reports no  breast lumps or masses. Denies breast pain.Denies any breast skin changes, changes to nipples or nipple discharge. Denies any past breast procedures.. Reports doing regular self breast exams. Past Mammogram were normal. No family history of breast or ovarian cancer. Family history of uterine cancer, Mother.    Patient is a .Patient reports no vaginal or vulva lesions, erythema or pruritus. Patient is having periods. LMP 2/10/25. Reports menstrual cycle as regular occurring every month and a regular flow.  Past Pap smear were normal. Denies any GYN procedures.     During pelvic exam small amount of menstrual bleeding noted. Seems to be first day of menstrual cycle this month. Patient denies any bleeding on yesterday and states that she was expecting menstruation to start this week. Patient made aware menstrual bleeding may impact results requiring a repeat Pap Smear.    Assessment/Plan    Well Woman Exam  - CBE is WNL  - May proceed with screening Mammogram  - Pelvic exam WNL, small amount of  menstrual bleeding noted  - PAP IG, Aptima HPV and rfx 16/18,45    Review of Systems   Constitutional: Negative.    Respiratory: Negative.     Cardiovascular: Negative.    Breast: Negative for breat pain breast lumps or masses. Denies any breast skin changes, changes to nipples or nipple discharge  Gastrointestinal: Negative.    Genitourinary:  Negative for dyspareunia, dysuria, genital sores, pelvic pain, vaginal discharge and vaginal pain.     Physical Exam  Constitutional:       General: She is not in acute distress.     Appearance: Normal appearance.   Pulmonary:      Effort: Pulmonary effort is normal.   Chest: Regular rhythm  Breasts:     Right: Normal. No inverted nipple, mass, nipple discharge, skin change or tenderness.      Left: Normal. No inverted nipple, mass, nipple discharge, skin

## 2025-03-13 LAB — HPV I/H RISK 1 DNA CVX QL PROBE+SIG AMP: NEGATIVE

## 2025-03-14 ENCOUNTER — RESULTS FOLLOW-UP (OUTPATIENT)
Facility: HOSPITAL | Age: 45
End: 2025-03-14

## 2025-03-14 NOTE — RESULT ENCOUNTER NOTE
Results of Pap was negative for lesion and malignancy. HPV negative     Plan of care next pap in 5 years.

## 2025-03-14 NOTE — RESULT ENCOUNTER NOTE
Mammogram results were  BI-RADS 1. Negative for malignancy. Next screening mammogram is recommended in one year.